# Patient Record
Sex: MALE | Race: WHITE | NOT HISPANIC OR LATINO | Employment: FULL TIME | URBAN - METROPOLITAN AREA
[De-identification: names, ages, dates, MRNs, and addresses within clinical notes are randomized per-mention and may not be internally consistent; named-entity substitution may affect disease eponyms.]

---

## 2024-04-13 DIAGNOSIS — I10 PRIMARY HYPERTENSION: Primary | ICD-10-CM

## 2024-04-15 RX ORDER — LISINOPRIL 20 MG/1
20 TABLET ORAL DAILY
Qty: 90 TABLET | Refills: 4 | Status: SHIPPED | OUTPATIENT
Start: 2024-04-15

## 2024-12-17 NOTE — PROGRESS NOTES
"Subjective      No chief complaint on file.         He has a history of a cardiomyopathy in 2017 when he presented with congestive heart failure echocardiogram at that time showed that the ejection fraction was was in 2025% range.  He was started on medication in June of that year the EF was 50 to 55%.  He did have atrial fibrillation he was cardioverted and then had A-fib ablation in April 2017.  He does have high blood pressure.           ROS     No past surgical history on file.     Active Ambulatory Problems     Diagnosis Date Noted    No Active Ambulatory Problems     Resolved Ambulatory Problems     Diagnosis Date Noted    No Resolved Ambulatory Problems     No Additional Past Medical History        There were no vitals taken for this visit.     Objective     Physical Exam       Lab Review:   {Recent labs:19471::\"not applicable\"}      Lab Results   Component Value Date    CHOL 223 (H) 01/31/2023     Lab Results   Component Value Date    HDL 46 01/31/2023     Lab Results   Component Value Date    LDLCALC 156 (H) 01/31/2023     Lab Results   Component Value Date    TRIG 103 01/31/2023     No components found for: \"CHOLHDL\"     Assessment/Plan     No problem-specific Assessment & Plan notes found for this encounter.     "

## 2024-12-19 ENCOUNTER — APPOINTMENT (OUTPATIENT)
Dept: CARDIOLOGY | Facility: CLINIC | Age: 64
End: 2024-12-19
Payer: COMMERCIAL

## 2025-01-14 ENCOUNTER — OFFICE VISIT (OUTPATIENT)
Dept: PRIMARY CARE | Facility: CLINIC | Age: 65
End: 2025-01-14
Payer: MEDICARE

## 2025-01-14 VITALS
SYSTOLIC BLOOD PRESSURE: 142 MMHG | HEART RATE: 76 BPM | WEIGHT: 224.8 LBS | BODY MASS INDEX: 30.49 KG/M2 | DIASTOLIC BLOOD PRESSURE: 90 MMHG

## 2025-01-14 DIAGNOSIS — G89.29 CHRONIC BILATERAL LOW BACK PAIN WITHOUT SCIATICA: ICD-10-CM

## 2025-01-14 DIAGNOSIS — Z83.719 FAMILY HISTORY OF COLONIC POLYPS: ICD-10-CM

## 2025-01-14 DIAGNOSIS — M54.50 CHRONIC BILATERAL LOW BACK PAIN WITHOUT SCIATICA: ICD-10-CM

## 2025-01-14 DIAGNOSIS — I10 PRIMARY HYPERTENSION: ICD-10-CM

## 2025-01-14 DIAGNOSIS — Z00.00 WELLNESS EXAMINATION: Primary | ICD-10-CM

## 2025-01-14 PROCEDURE — 99396 PREV VISIT EST AGE 40-64: CPT | Performed by: FAMILY MEDICINE

## 2025-01-14 PROCEDURE — 3080F DIAST BP >= 90 MM HG: CPT | Performed by: FAMILY MEDICINE

## 2025-01-14 PROCEDURE — 3077F SYST BP >= 140 MM HG: CPT | Performed by: FAMILY MEDICINE

## 2025-01-14 PROCEDURE — 1036F TOBACCO NON-USER: CPT | Performed by: FAMILY MEDICINE

## 2025-01-14 RX ORDER — CARVEDILOL 12.5 MG/1
1 TABLET ORAL 2 TIMES DAILY
COMMUNITY

## 2025-01-14 ASSESSMENT — PAIN SCALES - GENERAL: PAINLEVEL_OUTOF10: 0-NO PAIN

## 2025-01-14 ASSESSMENT — ENCOUNTER SYMPTOMS
DIZZINESS: 0
POLYDIPSIA: 0
DIARRHEA: 0
PALPITATIONS: 0
BLOOD IN STOOL: 0
CHEST TIGHTNESS: 0
NERVOUS/ANXIOUS: 0
DYSPHORIC MOOD: 0
SHORTNESS OF BREATH: 0
CONSTIPATION: 0
SLEEP DISTURBANCE: 0
TROUBLE SWALLOWING: 0
UNEXPECTED WEIGHT CHANGE: 0
DIFFICULTY URINATING: 0
FATIGUE: 0
ABDOMINAL PAIN: 0
HEADACHES: 0

## 2025-01-14 ASSESSMENT — PATIENT HEALTH QUESTIONNAIRE - PHQ9
2. FEELING DOWN, DEPRESSED OR HOPELESS: NOT AT ALL
SUM OF ALL RESPONSES TO PHQ9 QUESTIONS 1 AND 2: 0
1. LITTLE INTEREST OR PLEASURE IN DOING THINGS: NOT AT ALL

## 2025-01-14 NOTE — PROGRESS NOTES
Subjective   Patient ID: Jasiel Hernandez is a 64 y.o. male who presents for Annual Exam.    HPI   He presents today for his annual physical exam.  He states he been feeling pretty well.  He tries to remain active.  Plays basketball with his son and is felt well.  Gained 10 pounds on a cruise recently.  He states he realizes now that he is getting his every day habits in order.  Specifically get his diet good to be certain he is maintaining a regular exercise routine.    He does have some chronic back pain.  Like to go to physical therapy again for that.  That just flares up at times and he just wants to get that loosened up again.  He like to do his yearly lab tests.  Does have a family history of colon polyps in his father.  He is up-to-date on his colonoscopy.  Does that every 5 years.    He follows with Dr. Noriega for his blood pressure.    Review of Systems   Constitutional:  Negative for fatigue and unexpected weight change.   HENT:  Negative for congestion and trouble swallowing.    Respiratory:  Negative for chest tightness and shortness of breath.    Cardiovascular:  Negative for chest pain, palpitations and leg swelling.   Gastrointestinal:  Negative for abdominal pain, blood in stool, constipation and diarrhea.   Endocrine: Negative for polydipsia and polyuria.   Genitourinary:  Negative for difficulty urinating.   Neurological:  Negative for dizziness and headaches.   Psychiatric/Behavioral:  Negative for dysphoric mood and sleep disturbance. The patient is not nervous/anxious.        Objective   /90   Pulse 76   Wt 102 kg (224 lb 12.8 oz)   BMI 30.49 kg/m²     Physical Exam  Vitals reviewed.   Constitutional:       Appearance: Normal appearance.   HENT:      Right Ear: Tympanic membrane, ear canal and external ear normal.      Left Ear: Tympanic membrane, ear canal and external ear normal.      Nose: Nose normal.      Mouth/Throat:      Mouth: Mucous membranes are moist.      Pharynx: Oropharynx  is clear.   Eyes:      Conjunctiva/sclera: Conjunctivae normal.   Neck:      Thyroid: No thyromegaly or thyroid tenderness.      Vascular: No carotid bruit.   Cardiovascular:      Rate and Rhythm: Normal rate and regular rhythm.      Heart sounds: No murmur heard.  Pulmonary:      Effort: Pulmonary effort is normal.      Breath sounds: Normal breath sounds.   Abdominal:      General: Abdomen is flat.      Palpations: Abdomen is soft. There is no mass.      Tenderness: There is no abdominal tenderness.   Musculoskeletal:      Cervical back: Neck supple.      Right lower leg: No edema.      Left lower leg: No edema.   Lymphadenopathy:      Cervical: No cervical adenopathy.   Skin:     General: Skin is warm and dry.   Neurological:      Mental Status: He is alert.   Psychiatric:         Mood and Affect: Mood normal.       Assessment/Plan   Diagnoses and all orders for this visit:  Wellness examination  -     Prostate Specific Antigen; Future  Family history of colonic polyps  Chronic bilateral low back pain without sciatica  -     Referral to Physical Therapy; Future  Primary hypertension  -     Comprehensive Metabolic Panel; Future  -     Lipid Panel; Future  -     CBC; Future  Blood pressure just a little elevated today.  He will check that again with Dr. Antoine's office I did follow-up this month.  Referred him to physical therapy for the back pain.  He is up-to-date on his colonoscopy.  He will check the lab tests.

## 2025-01-27 ENCOUNTER — APPOINTMENT (OUTPATIENT)
Dept: PHYSICAL THERAPY | Facility: HOSPITAL | Age: 65
End: 2025-01-27
Payer: MEDICARE

## 2025-01-27 ENCOUNTER — DOCUMENTATION (OUTPATIENT)
Dept: PHYSICAL THERAPY | Facility: HOSPITAL | Age: 65
End: 2025-01-27
Payer: MEDICARE

## 2025-01-27 NOTE — PROGRESS NOTES
Physical Therapy                 Therapy Communication Note    Patient Name: Jasiel Hernandez  MRN: [id number]  Department:   Room: Room/bed info not found  Today's Date: 1/27/2025     Discipline: Physical Therapy    Missed Visit Reason:  PT Eval Cancel    Missed Time: 8:30 AM 1/27/2025

## 2025-01-28 LAB
ALBUMIN SERPL-MCNC: 4.7 G/DL (ref 3.6–5.1)
ALP SERPL-CCNC: 77 U/L (ref 35–144)
ALT SERPL-CCNC: 18 U/L (ref 9–46)
ANION GAP SERPL CALCULATED.4IONS-SCNC: 9 MMOL/L (CALC) (ref 7–17)
AST SERPL-CCNC: 20 U/L (ref 10–35)
BILIRUB SERPL-MCNC: 0.7 MG/DL (ref 0.2–1.2)
BUN SERPL-MCNC: 16 MG/DL (ref 7–25)
CALCIUM SERPL-MCNC: 9.8 MG/DL (ref 8.6–10.3)
CHLORIDE SERPL-SCNC: 101 MMOL/L (ref 98–110)
CHOLEST SERPL-MCNC: 216 MG/DL
CHOLEST/HDLC SERPL: 4.5 (CALC)
CO2 SERPL-SCNC: 27 MMOL/L (ref 20–32)
CREAT SERPL-MCNC: 1.02 MG/DL (ref 0.7–1.35)
EGFRCR SERPLBLD CKD-EPI 2021: 82 ML/MIN/1.73M2
ERYTHROCYTE [DISTWIDTH] IN BLOOD BY AUTOMATED COUNT: 12.1 % (ref 11–15)
GLUCOSE SERPL-MCNC: 85 MG/DL (ref 65–99)
HCT VFR BLD AUTO: 46.7 % (ref 38.5–50)
HDLC SERPL-MCNC: 48 MG/DL
HGB BLD-MCNC: 16.2 G/DL (ref 13.2–17.1)
LDLC SERPL CALC-MCNC: 142 MG/DL (CALC)
MCH RBC QN AUTO: 30.9 PG (ref 27–33)
MCHC RBC AUTO-ENTMCNC: 34.7 G/DL (ref 32–36)
MCV RBC AUTO: 89 FL (ref 80–100)
NONHDLC SERPL-MCNC: 168 MG/DL (CALC)
PLATELET # BLD AUTO: 215 THOUSAND/UL (ref 140–400)
PMV BLD REES-ECKER: 10.9 FL (ref 7.5–12.5)
POTASSIUM SERPL-SCNC: 5.1 MMOL/L (ref 3.5–5.3)
PROT SERPL-MCNC: 7.6 G/DL (ref 6.1–8.1)
PSA SERPL-MCNC: 0.33 NG/ML
RBC # BLD AUTO: 5.25 MILLION/UL (ref 4.2–5.8)
SODIUM SERPL-SCNC: 137 MMOL/L (ref 135–146)
TRIGL SERPL-MCNC: 137 MG/DL
WBC # BLD AUTO: 7.1 THOUSAND/UL (ref 3.8–10.8)

## 2025-02-03 ENCOUNTER — EVALUATION (OUTPATIENT)
Dept: PHYSICAL THERAPY | Facility: HOSPITAL | Age: 65
End: 2025-02-03
Payer: MEDICARE

## 2025-02-03 DIAGNOSIS — M54.50 CHRONIC BILATERAL LOW BACK PAIN WITHOUT SCIATICA: ICD-10-CM

## 2025-02-03 DIAGNOSIS — M54.9 DORSALGIA: Primary | ICD-10-CM

## 2025-02-03 DIAGNOSIS — G89.29 CHRONIC BILATERAL LOW BACK PAIN WITHOUT SCIATICA: ICD-10-CM

## 2025-02-03 PROCEDURE — 97161 PT EVAL LOW COMPLEX 20 MIN: CPT | Mod: GP | Performed by: PHYSICAL THERAPIST

## 2025-02-03 PROCEDURE — 97110 THERAPEUTIC EXERCISES: CPT | Mod: GP | Performed by: PHYSICAL THERAPIST

## 2025-02-03 ASSESSMENT — PAIN SCALES - GENERAL: PAINLEVEL_OUTOF10: 2

## 2025-02-03 ASSESSMENT — PAIN - FUNCTIONAL ASSESSMENT: PAIN_FUNCTIONAL_ASSESSMENT: 0-10

## 2025-02-03 ASSESSMENT — PAIN DESCRIPTION - DESCRIPTORS: DESCRIPTORS: ACHING;SHARP

## 2025-02-03 ASSESSMENT — ENCOUNTER SYMPTOMS
LOSS OF SENSATION IN FEET: 0
OCCASIONAL FEELINGS OF UNSTEADINESS: 0
DEPRESSION: 0
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
## 2025-02-05 NOTE — PROGRESS NOTES
Subjective      Chief Complaint   Patient presents with    Follow-up          He has a history of congestive heart failure echocardiogram in 2017 showed an EF of 20 to 25%.  He was in atrial fibrillation he was cardioverted.  Sometime after the cardioversion repeat echocardiogram showed the EF was 50 to 55%.  In 2017 he had radiofrequency ablation.  Is felt he has nonischemic cardiomyopathy at that time by stress test he never had a heart catheterization.  Is doing well and is active.  The BP is doing well He is not complaining of chest discomfort.  NO PND or orthopnea.  The legs are not swelling on him.  He does not complain of palpitations.  The chol is high and has not been on a statin yet           Review of Systems   Constitutional: Negative. Negative for chills and fever.   HENT: Negative.     Eyes: Negative.    Respiratory: Negative.  Negative for cough.    Endocrine: Negative.    Skin: Negative.    Musculoskeletal: Negative.  Negative for falls.   Gastrointestinal: Negative.    Genitourinary: Negative.    Neurological: Negative.    All other systems reviewed and are negative.       History reviewed. No pertinent surgical history.     Active Ambulatory Problems     Diagnosis Date Noted    Chronic bilateral low back pain without sciatica 02/03/2025    Dorsalgia 02/03/2025    Primary hypertension 02/06/2025    Hypercholesterolemia 02/06/2025     Resolved Ambulatory Problems     Diagnosis Date Noted    No Resolved Ambulatory Problems     No Additional Past Medical History        Visit Vitals  /76   Pulse 56   Wt 102 kg (225 lb)   SpO2 97%   BMI 30.52 kg/m²   Smoking Status Never   BSA 2.28 m²        Objective     Constitutional:       Appearance: Healthy appearance.   Eyes:      Pupils: Pupils are equal, round, and reactive to light.   Neck:      Vascular: No JVR. JVD normal.   Pulmonary:      Effort: Pulmonary effort is normal.      Breath sounds: Normal breath sounds.   Cardiovascular:      PMI at left  "midclavicular line. Normal rate. Regular rhythm. Normal S1. Normal S2.       Murmurs: There is no murmur.      No gallop.  No click. No rub.   Pulses:     Intact distal pulses.   Edema:     Peripheral edema absent.   Abdominal:      Palpations: Abdomen is soft.      Tenderness: There is no abdominal tenderness.   Musculoskeletal: Normal range of motion.      Extremities: No clubbing present.Skin:     General: Skin is warm and dry.   Neurological:      General: No focal deficit present.            Lab Review:         Lab Results   Component Value Date    CHOL 216 (H) 01/27/2025    CHOL 223 (H) 01/31/2023     Lab Results   Component Value Date    HDL 48 01/27/2025    HDL 46 01/31/2023     Lab Results   Component Value Date    LDLCALC 142 (H) 01/27/2025    LDLCALC 156 (H) 01/31/2023     Lab Results   Component Value Date    TRIG 137 01/27/2025    TRIG 103 01/31/2023     No components found for: \"CHOLHDL\"     Assessment/Plan     Primary hypertension  The BP is doing well and no angina and no chf.    Hypercholesterolemia  The chol is high and will start on crestor     "

## 2025-02-06 ENCOUNTER — OFFICE VISIT (OUTPATIENT)
Dept: CARDIOLOGY | Facility: CLINIC | Age: 65
End: 2025-02-06
Payer: MEDICARE

## 2025-02-06 VITALS
OXYGEN SATURATION: 97 % | HEART RATE: 56 BPM | DIASTOLIC BLOOD PRESSURE: 76 MMHG | WEIGHT: 225 LBS | BODY MASS INDEX: 30.52 KG/M2 | SYSTOLIC BLOOD PRESSURE: 128 MMHG

## 2025-02-06 DIAGNOSIS — I10 PRIMARY HYPERTENSION: Primary | ICD-10-CM

## 2025-02-06 DIAGNOSIS — E78.00 HYPERCHOLESTEROLEMIA: ICD-10-CM

## 2025-02-06 PROCEDURE — 3074F SYST BP LT 130 MM HG: CPT | Performed by: INTERNAL MEDICINE

## 2025-02-06 PROCEDURE — 99213 OFFICE O/P EST LOW 20 MIN: CPT | Performed by: INTERNAL MEDICINE

## 2025-02-06 PROCEDURE — 1036F TOBACCO NON-USER: CPT | Performed by: INTERNAL MEDICINE

## 2025-02-06 PROCEDURE — 3078F DIAST BP <80 MM HG: CPT | Performed by: INTERNAL MEDICINE

## 2025-02-06 RX ORDER — ROSUVASTATIN CALCIUM 20 MG/1
20 TABLET, COATED ORAL DAILY
Qty: 100 TABLET | Refills: 3 | Status: SHIPPED | OUTPATIENT
Start: 2025-02-06 | End: 2026-03-13

## 2025-02-06 RX ORDER — LISINOPRIL 20 MG/1
20 TABLET ORAL DAILY
COMMUNITY

## 2025-02-06 ASSESSMENT — ENCOUNTER SYMPTOMS
NEUROLOGICAL NEGATIVE: 1
FALLS: 0
MUSCULOSKELETAL NEGATIVE: 1
CHILLS: 0
FEVER: 0
COUGH: 0
RESPIRATORY NEGATIVE: 1
GASTROINTESTINAL NEGATIVE: 1
ENDOCRINE NEGATIVE: 1
CONSTITUTIONAL NEGATIVE: 1
EYES NEGATIVE: 1

## 2025-02-06 ASSESSMENT — PAIN SCALES - GENERAL: PAINLEVEL_OUTOF10: 0-NO PAIN

## 2025-03-06 DIAGNOSIS — E78.00 HYPERCHOLESTEROLEMIA: ICD-10-CM

## 2025-03-25 ENCOUNTER — TELEPHONE (OUTPATIENT)
Dept: PHYSICAL THERAPY | Facility: HOSPITAL | Age: 65
End: 2025-03-25
Payer: MEDICARE

## 2025-04-01 ENCOUNTER — TREATMENT (OUTPATIENT)
Dept: PHYSICAL THERAPY | Facility: HOSPITAL | Age: 65
End: 2025-04-01
Payer: MEDICARE

## 2025-04-01 DIAGNOSIS — G89.29 CHRONIC BILATERAL LOW BACK PAIN WITHOUT SCIATICA: Primary | ICD-10-CM

## 2025-04-01 DIAGNOSIS — M54.50 CHRONIC BILATERAL LOW BACK PAIN WITHOUT SCIATICA: Primary | ICD-10-CM

## 2025-04-01 PROCEDURE — 97110 THERAPEUTIC EXERCISES: CPT | Mod: GP | Performed by: PHYSICAL THERAPIST

## 2025-04-01 ASSESSMENT — PAIN DESCRIPTION - DESCRIPTORS: DESCRIPTORS: ACHING;DULL

## 2025-04-01 ASSESSMENT — PAIN - FUNCTIONAL ASSESSMENT: PAIN_FUNCTIONAL_ASSESSMENT: 0-10

## 2025-04-01 ASSESSMENT — PAIN SCALES - GENERAL: PAINLEVEL_OUTOF10: 3

## 2025-04-01 NOTE — PROGRESS NOTES
Physical Therapy Reassessment and Treatment    Patient Name: Jasiel Hernandez  MRN: [id number]  Today's Date: 4/1/2025  Time Calculation  Start Time: 0831  Stop Time: 0915  Time Calculation (min): 44 min        PT Therapeutic Procedures Time Entry  Therapeutic Exercise Time Entry: 44                Insurance:  Visit Limit: 2 of 5 authorized; 20 visits/yr  Date Range: 2/3-5/2  Co-Pay: $35    Current Problem  1. Chronic bilateral low back pain without sciatica  Follow Up In Physical Therapy        Problem List Items Addressed This Visit             ICD-10-CM    Chronic bilateral low back pain without sciatica - Primary M54.50, G89.29       General  Reason for Referral: low back pain  Referred By: Dr. Pillai    Subjective   Current Condition:   Same  Patient reports lying prone helps decrease his low back pain. Patient states that turning and lifting 20# or more aggravates his back pain.    Performing HEP?: Partially    Precautions  Precautions  Medical Precautions: No known precautions/limitation  Pain  Pain Assessment: 0-10  0-10 (Numeric) Pain Score: 3  Pain Type: Chronic pain  Pain Location: Back  Pain Orientation: Right, Lower  Pain Descriptors: Aching, Dull    Objective   Lumbar Spine  Lumbar AROM  Lumbar flexion: (60°): min restriction  Lumbar extension (25°): WFL  Lumbar rotation right (30°): WFL (Right LBP)  Lumbar rotation left (30°): WFL  Lumbar sidebend right (25°): min restriction (Right LBP)  Lumbar sidebend left (25°): min restriction    Hip PROM  R hip flexion: (125°): WFL  L hip flexion: (125°): WFL  R hip abduction: (45°): WFL  L hip abduction: (45°): WFL  R hip ER: (45°): wFL  L hip ER: (45°): WFL  R hip IR: (45°): min restriction  L hip IR: (45°): min restriction    Specific Lower Extremity MMT  R Iliopsoas: (5/5): 5/5  L Iliopsoas: (5/5): 5/5  R Gluteals (prone): (5/5): 4/5  L Gluteals (prone): (5/5): 4+/5  R Gluteals (sidelying): (5/5): 4+/5  L Gluteals (sidelying): (5/5): 4+/5    Flexibility  R  "hamstrings: WFL  L hamstrings: WFL  R piriformis: min restriction  L piriformis: min restriction      Knee AROM  Knee AROM WFL: yes    Knee MMT  Knee MMT WFL: yes    Outcome Measures:  Other Measures  Oswestry Disablity Index (KEITH): 22%    Treatments:  Therapeutic Exercise  Therapeutic Exercise Performed: Yes  Therapeutic Exercise Activity 1: bridges x10  Therapeutic Exercise Activity 6: prone lying x60\"  Therapeutic Exercise Activity 7: prone on elbows x60\"  Therapeutic Exercise Activity 8: STS 4#MB x10  Therapeutic Exercise Activity 9: hip toss/golfswing 4#MB x10       EDUCATION:   Individual(s) Educated: Patient  Education Provided: yes  Handout(s) Provided: Scanned into chart  Home Program: Access Code: [email or web address]  URL: [email or web address]  Date: 04/01/2025  Prepared by: Ghassan Gupta    Exercises  - Anti-Rotation Press With Sidesteps and Anchored Resistance  - 1-2 x daily - 10 reps  - Mini Squat to Shoulder Press with Barbell  - 1-2 x daily - 10 reps  - Seated Diagonal Chops with Medicine Ball  - 1-2 x daily - 10 reps  - Supine Bridge  - 1-2 x daily - 10 reps  - Prone Press Up On Elbows  - 1-2 x daily - 1 reps - 30-60 seconds hold  - Prone Press Up  - 1-2 x daily - 5-10 reps - 3-5 seconds hold  - Quadruped Thoracic Rotation - Reach Under  - 1-2 x daily - 10 reps  - Supine Piriformis Stretch with Foot on Ground  - 1-2 x daily - 3 reps - 15 seconds hold    Risk and Benefits Discussed with Patient/Caregiver/Other: Yes   Patient/Caregiver Demonstrated Understanding: Yes   Patient Response to Education: Patient/Caregiver verbalized understanding of information, Patient/Caregiver performed return demonstration of exercises/activities, and Patient/Caregiver asked appropriate questions    Assessment: Patient demonstrated impaired range of motion/flexibility and improved strength. Patient is progressing slowly with physical therapy. Patient's home exercise program was updated as reflected above. " Patient demonstrated good ability to perform without increased low back pain. Skilled physical therapy is warranted to address the above stated impairments, so the patient can perform functional activities and work duties without increased pain or difficulty.   PT Assessment  PT Assessment Results: Decreased strength, Decreased range of motion, Pain  Rehab Prognosis: Good  Evaluation/Treatment Tolerance: Patient tolerated treatment well    Plan: Continue with POC. Progress exercises as tolerated.  OP PT Plan  Treatment/Interventions: Education/ Instruction, Gait training, Manual therapy, Neuromuscular re-education, Therapeutic activities, Therapeutic exercises  PT Plan: Skilled PT  PT Frequency: 1 time per week  Duration: 5 weeks  Onset Date: 01/14/25  Certification Period Start Date: 02/03/25  Certification Period End Date: 05/02/25  Number of Treatments Authorized: 2 of 5  Rehab Potential: Good  Plan of Care Agreement: Patient    Goals:  Active       PT Problem       Patient will demonstrate improved piriformis flexibility in bilateral lower extremities WFL. (Progressing)       Start:  02/03/25    Expected End:  05/02/25            Patient will maintain single leg stand on each leg for at least 20 sec with no upper extremity support. (Met)       Start:  02/03/25    Expected End:  03/10/25    Resolved:  04/01/25         Patient will achieve spinal flexion to WFL. (Not Progressing)       Start:  02/03/25    Expected End:  05/02/25            Patient will achieve bilateral spinal side bending ROM WFL without pain. (Progressing)       Start:  02/03/25    Expected End:  05/02/25            Patient will achieve bilateral spinal rotation ROM WFL. (Progressing)       Start:  02/03/25    Expected End:  05/02/25            Patient will achieve bilateral hip extension strength of at least 4+/5 (Progressing)       Start:  02/03/25    Expected End:  05/02/25            Patient will achieve bilateral hip abduction strength of  at least 4+/5 (Met)       Start:  02/03/25    Expected End:  03/10/25    Resolved:  04/01/25         Patient will demonstrate independence in home program for support of progression (Not Progressing)       Start:  02/03/25    Expected End:  05/02/25         Goal Note       Patient is not performing home exercise program.              Patient will report pain of no more than 2/10 demonstrating a reduction of overall pain (Not Progressing)       Start:  02/03/25    Expected End:  05/02/25            Patient will show a significant change in KEITH (18% to 6%) patient reported outcome tool to demonstrate subjective imporovement (Not Progressing)       Start:  02/03/25    Expected End:  05/02/25                 Ghassan Gupta, PT

## 2025-05-09 ENCOUNTER — DOCUMENTATION (OUTPATIENT)
Dept: PHYSICAL THERAPY | Facility: HOSPITAL | Age: 65
End: 2025-05-09
Payer: MEDICARE
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
